# Patient Record
Sex: FEMALE | URBAN - METROPOLITAN AREA
[De-identification: names, ages, dates, MRNs, and addresses within clinical notes are randomized per-mention and may not be internally consistent; named-entity substitution may affect disease eponyms.]

---

## 2023-05-18 ENCOUNTER — APPOINTMENT (RX ONLY)
Dept: URBAN - METROPOLITAN AREA CLINIC 142 | Facility: CLINIC | Age: 33
Setting detail: DERMATOLOGY
End: 2023-05-18

## 2023-05-18 DIAGNOSIS — L81.4 OTHER MELANIN HYPERPIGMENTATION: ICD-10-CM

## 2023-05-18 DIAGNOSIS — L80 VITILIGO: ICD-10-CM

## 2023-05-18 DIAGNOSIS — L81.2 FRECKLES: ICD-10-CM

## 2023-05-18 PROCEDURE — ? TREATMENT REGIMEN

## 2023-05-18 PROCEDURE — ? PRESCRIPTION

## 2023-05-18 PROCEDURE — ? SUNSCREEN RECOMMENDATIONS

## 2023-05-18 PROCEDURE — ? COUNSELING

## 2023-05-18 PROCEDURE — 99203 OFFICE O/P NEW LOW 30 MIN: CPT

## 2023-05-18 RX ORDER — RUXOLITINIB 15 MG/G
CREAM TOPICAL
Qty: 60 | Refills: 2 | Status: ERX | COMMUNITY
Start: 2023-05-18

## 2023-05-18 RX ADMIN — RUXOLITINIB: 15 CREAM TOPICAL at 00:00

## 2023-05-18 ASSESSMENT — LOCATION SIMPLE DESCRIPTION DERM
LOCATION SIMPLE: RIGHT UPPER BACK
LOCATION SIMPLE: LEFT LIP
LOCATION SIMPLE: RIGHT BREAST
LOCATION SIMPLE: CHEST
LOCATION SIMPLE: LEFT CHEEK
LOCATION SIMPLE: LEFT UPPER BACK
LOCATION SIMPLE: LEFT SHOULDER

## 2023-05-18 ASSESSMENT — LOCATION DETAILED DESCRIPTION DERM
LOCATION DETAILED: MIDDLE STERNUM
LOCATION DETAILED: LEFT SUPERIOR UPPER BACK
LOCATION DETAILED: RIGHT MEDIAL BREAST 1-2:00 REGION
LOCATION DETAILED: LEFT INFERIOR VERMILION LIP
LOCATION DETAILED: RIGHT SUPERIOR UPPER BACK
LOCATION DETAILED: LEFT POSTERIOR SHOULDER
LOCATION DETAILED: LEFT ANTERIOR SHOULDER
LOCATION DETAILED: LEFT CENTRAL MALAR CHEEK

## 2023-05-18 ASSESSMENT — LOCATION ZONE DERM
LOCATION ZONE: LIP
LOCATION ZONE: TRUNK
LOCATION ZONE: ARM
LOCATION ZONE: FACE

## 2023-05-18 NOTE — PROCEDURE: TREATMENT REGIMEN
Initiate Treatment: Opzelura - Apply a thin layer to affected areas of vitiligo BID
Detail Level: Simple

## 2023-06-02 ENCOUNTER — RX ONLY (OUTPATIENT)
Age: 33
Setting detail: RX ONLY
End: 2023-06-02

## 2023-06-02 RX ORDER — TACROLIMUS 1 MG/G
OINTMENT TOPICAL
Qty: 60 | Refills: 2 | COMMUNITY
Start: 2023-06-02

## 2023-11-20 ENCOUNTER — APPOINTMENT (RX ONLY)
Dept: URBAN - METROPOLITAN AREA CLINIC 142 | Facility: CLINIC | Age: 33
Setting detail: DERMATOLOGY
End: 2023-11-20

## 2023-11-20 DIAGNOSIS — L80 VITILIGO: ICD-10-CM

## 2023-11-20 PROCEDURE — ? COUNSELING

## 2023-11-20 PROCEDURE — 99213 OFFICE O/P EST LOW 20 MIN: CPT

## 2023-11-20 PROCEDURE — ? PRESCRIPTION MEDICATION MANAGEMENT

## 2023-11-20 PROCEDURE — ? PRESCRIPTION

## 2023-11-20 RX ORDER — RUXOLITINIB 15 MG/G
CREAM TOPICAL
Qty: 60 | Refills: 2 | Status: ERX

## 2023-11-20 ASSESSMENT — LOCATION DETAILED DESCRIPTION DERM
LOCATION DETAILED: RIGHT PROXIMAL POSTERIOR UPPER ARM
LOCATION DETAILED: LEFT PROXIMAL POSTERIOR UPPER ARM

## 2023-11-20 ASSESSMENT — LOCATION ZONE DERM: LOCATION ZONE: ARM

## 2023-11-20 ASSESSMENT — LOCATION SIMPLE DESCRIPTION DERM
LOCATION SIMPLE: LEFT POSTERIOR UPPER ARM
LOCATION SIMPLE: RIGHT POSTERIOR UPPER ARM

## 2023-11-20 NOTE — PROCEDURE: MIPS QUALITY
Quality 226: Preventive Care And Screening: Tobacco Use: Screening And Cessation Intervention: Patient screened for tobacco use and is an ex/non-smoker
Additional Notes: Pt is declining flu shot this year
Detail Level: Detailed
Quality 431: Preventive Care And Screening: Unhealthy Alcohol Use - Screening: Patient not identified as an unhealthy alcohol user when screened for unhealthy alcohol use using a systematic screening method
Quality 110: Preventive Care And Screening: Influenza Immunization: Influenza Immunization not Administered for Documented Reasons.

## 2023-11-20 NOTE — PROCEDURE: PRESCRIPTION MEDICATION MANAGEMENT
Initiate Treatment: Opzelura: apply to affected area BID
Detail Level: Zone
Render In Strict Bullet Format?: No
Discontinue Regimen: Tacrolimus ointment due to efficacy

## 2024-05-20 ENCOUNTER — APPOINTMENT (RX ONLY)
Dept: URBAN - METROPOLITAN AREA CLINIC 142 | Facility: CLINIC | Age: 34
Setting detail: DERMATOLOGY
End: 2024-05-20

## 2024-05-20 DIAGNOSIS — L80 VITILIGO: ICD-10-CM | Status: UNCHANGED

## 2024-05-20 DIAGNOSIS — L81.4 OTHER MELANIN HYPERPIGMENTATION: ICD-10-CM

## 2024-05-20 PROCEDURE — ? PRESCRIPTION SAMPLES PROVIDED

## 2024-05-20 PROCEDURE — 99213 OFFICE O/P EST LOW 20 MIN: CPT

## 2024-05-20 PROCEDURE — ? COUNSELING

## 2024-05-20 PROCEDURE — ? PRESCRIPTION MEDICATION MANAGEMENT

## 2024-05-20 PROCEDURE — ? SUNSCREEN RECOMMENDATIONS

## 2024-05-20 ASSESSMENT — LOCATION DETAILED DESCRIPTION DERM
LOCATION DETAILED: LEFT INFERIOR MEDIAL MALAR CHEEK
LOCATION DETAILED: LEFT AXILLARY VAULT
LOCATION DETAILED: RIGHT AXILLARY VAULT

## 2024-05-20 ASSESSMENT — LOCATION ZONE DERM
LOCATION ZONE: FACE
LOCATION ZONE: AXILLAE

## 2024-05-20 ASSESSMENT — LOCATION SIMPLE DESCRIPTION DERM
LOCATION SIMPLE: LEFT CHEEK
LOCATION SIMPLE: RIGHT AXILLARY VAULT
LOCATION SIMPLE: LEFT AXILLARY VAULT

## 2024-05-20 NOTE — PROCEDURE: PRESCRIPTION MEDICATION MANAGEMENT
Detail Level: Zone
Discontinue Regimen: Tacrolimus
Render In Strict Bullet Format?: No
Plan: Unable to use steroids due to the body areas of axillae